# Patient Record
Sex: FEMALE
[De-identification: names, ages, dates, MRNs, and addresses within clinical notes are randomized per-mention and may not be internally consistent; named-entity substitution may affect disease eponyms.]

---

## 2020-09-14 ENCOUNTER — RESULT CHARGE (OUTPATIENT)
Age: 70
End: 2020-09-14

## 2020-09-14 ENCOUNTER — APPOINTMENT (OUTPATIENT)
Dept: UROLOGY | Facility: HOSPITAL | Age: 70
End: 2020-09-14
Payer: MEDICARE

## 2020-09-14 VITALS
HEIGHT: 63 IN | BODY MASS INDEX: 44.3 KG/M2 | HEART RATE: 88 BPM | TEMPERATURE: 97.3 F | DIASTOLIC BLOOD PRESSURE: 85 MMHG | SYSTOLIC BLOOD PRESSURE: 160 MMHG | WEIGHT: 250 LBS

## 2020-09-14 DIAGNOSIS — Z00.00 ENCOUNTER FOR GENERAL ADULT MEDICAL EXAMINATION W/OUT ABNORMAL FINDINGS: ICD-10-CM

## 2020-09-14 LAB
BILIRUB UR QL STRIP: NORMAL
CLARITY UR: CLEAR
COLLECTION METHOD: NORMAL
GLUCOSE UR-MCNC: NORMAL
HCG UR QL: 0.2 EU/DL
HGB UR QL STRIP.AUTO: NORMAL
KETONES UR-MCNC: NORMAL
LEUKOCYTE ESTERASE UR QL STRIP: NORMAL
NITRITE UR QL STRIP: NORMAL
PH UR STRIP: 6.5
PROT UR STRIP-MCNC: NORMAL
SP GR UR STRIP: 1.01

## 2020-09-14 PROCEDURE — 99214 OFFICE O/P EST MOD 30 MIN: CPT

## 2020-09-14 RX ORDER — PENTOSAN POLYSULFATE SODIUM 100 MG/1
100 CAPSULE, GELATIN COATED ORAL 3 TIMES DAILY
Qty: 270 | Refills: 3 | Status: ACTIVE | COMMUNITY
Start: 2020-09-14 | End: 1900-01-01

## 2020-09-14 RX ORDER — PHENAZOPYRIDINE HYDROCHLORIDE 200 MG/1
200 TABLET ORAL 3 TIMES DAILY
Qty: 9 | Refills: 3 | Status: ACTIVE | COMMUNITY
Start: 2020-09-14 | End: 1900-01-01

## 2020-09-14 NOTE — PHYSICAL EXAM
[General Appearance - Well Nourished] : well nourished [General Appearance - Well Developed] : well developed [Well Groomed] : well groomed [Normal Appearance] : normal appearance [Abdomen Soft] : soft [General Appearance - In No Acute Distress] : no acute distress [Abdomen Tenderness] : non-tender [Urethral Meatus] : meatus normal [Costovertebral Angle Tenderness] : no ~M costovertebral angle tenderness [Scrotum] : the scrotum was normal [Testes Mass (___cm)] : there were no testicular masses [Urinary Bladder Findings] : the bladder was normal on palpation [No Prostate Nodules] : no prostate nodules [Edema] : no peripheral edema [] : no rash [Exaggerated Use Of Accessory Muscles For Inspiration] : no accessory muscle use [Oriented To Time, Place, And Person] : oriented to person, place, and time [Respiration, Rhythm And Depth] : normal respiratory rhythm and effort [Mood] : the mood was normal [Affect] : the affect was normal [Not Anxious] : not anxious [Normal Station and Gait] : the gait and station were normal for the patient's age [No Focal Deficits] : no focal deficits [No Palpable Adenopathy] : no palpable adenopathy

## 2020-09-14 NOTE — ASSESSMENT
[FreeTextEntry1] : 70 year old female with Interstitial Cystitis.  Elmiron renewed and urine sent for culture.  Pt to see Dr. Willingham in coming weeks.  Possible will need tighter control over glucose as recent weight increase.  Urine sent for culture.

## 2020-09-14 NOTE — HISTORY OF PRESENT ILLNESS
[FreeTextEntry1] : Pt is a 70 year old female with long standing hx of intermittent bladder symptoms.  She was diagnosed with interstitial cystitis years ago and takes Elmiron and sertraline.  She ran out of Elmiron in the setting of the global pandemic and presents complaining of intermittent and worsening bladder discomfort.  She also admits to recent caffeine intake, which is new for her. \par \par cystoscopy and CT scan 8/2019\par \par PMH: HTN, GERD, asthma, spinal stenosis, DM\par PSH: cervical polyp\par \par

## 2020-09-16 LAB — BACTERIA UR CULT: NORMAL

## 2021-03-15 ENCOUNTER — APPOINTMENT (OUTPATIENT)
Dept: UROLOGY | Facility: CLINIC | Age: 71
End: 2021-03-15

## 2021-11-08 ENCOUNTER — APPOINTMENT (OUTPATIENT)
Dept: UROLOGY | Facility: CLINIC | Age: 71
End: 2021-11-08
Payer: MEDICARE

## 2021-11-08 VITALS
OXYGEN SATURATION: 97 % | BODY MASS INDEX: 46.06 KG/M2 | HEART RATE: 94 BPM | WEIGHT: 260 LBS | SYSTOLIC BLOOD PRESSURE: 146 MMHG | DIASTOLIC BLOOD PRESSURE: 90 MMHG

## 2021-11-08 DIAGNOSIS — N30.10 INTERSTITIAL CYSTITIS (CHRONIC) W/OUT HEMATURIA: ICD-10-CM

## 2021-11-08 PROCEDURE — 99214 OFFICE O/P EST MOD 30 MIN: CPT

## 2021-11-08 PROCEDURE — 81003 URINALYSIS AUTO W/O SCOPE: CPT | Mod: QW

## 2021-11-08 PROCEDURE — 51798 US URINE CAPACITY MEASURE: CPT

## 2021-11-08 RX ORDER — PENTOSAN POLYSULFATE SODIUM 100 MG/1
100 CAPSULE, GELATIN COATED ORAL 3 TIMES DAILY
Qty: 270 | Refills: 3 | Status: ACTIVE | COMMUNITY
Start: 2021-11-08 | End: 1900-01-01

## 2021-11-09 LAB
BILIRUB UR QL STRIP: NORMAL
CLARITY UR: CLEAR
COLLECTION METHOD: NORMAL
GLUCOSE UR-MCNC: NORMAL
HCG UR QL: 0.2 EU/DL
HGB UR QL STRIP.AUTO: NORMAL
KETONES UR-MCNC: NORMAL
LEUKOCYTE ESTERASE UR QL STRIP: NORMAL
NITRITE UR QL STRIP: NORMAL
PH UR STRIP: 6
PROT UR STRIP-MCNC: NORMAL
SP GR UR STRIP: 1.01

## 2021-11-09 NOTE — ADDENDUM
[FreeTextEntry1] : A portion of this note was written by [Gabriela Torres] on 11/08/2021 acting as a scribe for Dr. Zelaya. \par \par I have personally reviewed the chart and agree that the record accurately reflects my personal performance of the history, physical exam, assessment, and plan.

## 2021-11-09 NOTE — ASSESSMENT
[FreeTextEntry1] : 71 year old female with Interstitial Cystitis, stable on Elmiron. She plans to review her Elmiron use with her opthalmologist and will see a retinal specialist if they suggest she do so.  In the meantime I renewed her Rx and we will complete the prior authorization process as we have done in the past. \par \par Today's urine was sent for culture.   \par

## 2021-11-09 NOTE — HISTORY OF PRESENT ILLNESS
[FreeTextEntry1] : Pt is a 71 year old female with long standing hx of intermittent bladder symptoms.  She was diagnosed with interstitial cystitis years ago and takes Elmiron and sertraline. Today, she states she is still doing well on Elmiron. In August, she fell forward broke her toe.  Shortly after her fall, she had increased urinary frequency for 1 week which has since resolved. At the time she denies hematuria.  She has not seen a retinal specialist as I have previously suggested given the prolonged Elmiron use.  \par \par Two months ago she was started on metformin by her PCP for new onset diabetes.  She is scheduled for cataract surgery in 2/2022. \par \par Pt is fully vaccinated. \par \par cystoscopy and CT scan 8/2019\par \par Udip: (+) small garrett.\par \par PMH: HTN, GERD, asthma, spinal stenosis, DM\par PSH: cervical polyp\par \par Full Hx:  Pt is a 70 year old female with long standing hx of intermittent bladder symptoms.  She was diagnosed with interstitial cystitis years ago and takes Elmiron and sertraline.  She ran out of Elmiron in the setting of the global pandemic and presents complaining of intermittent and worsening bladder discomfort.  She also admits to recent caffeine intake, which is new for her. \par

## 2021-11-22 DIAGNOSIS — N39.0 URINARY TRACT INFECTION, SITE NOT SPECIFIED: ICD-10-CM

## 2021-11-22 LAB — BACTERIA UR CULT: ABNORMAL

## 2021-11-22 RX ORDER — CEFPODOXIME PROXETIL 200 MG/1
200 TABLET, FILM COATED ORAL
Qty: 10 | Refills: 0 | Status: ACTIVE | COMMUNITY
Start: 2021-11-22 | End: 1900-01-01

## 2022-12-05 ENCOUNTER — RX RENEWAL (OUTPATIENT)
Age: 72
End: 2022-12-05

## 2023-04-05 DIAGNOSIS — R30.0 DYSURIA: ICD-10-CM

## 2023-07-11 ENCOUNTER — APPOINTMENT (OUTPATIENT)
Dept: UROLOGY | Facility: CLINIC | Age: 73
End: 2023-07-11
Payer: MEDICARE

## 2023-07-11 VITALS
WEIGHT: 265 LBS | BODY MASS INDEX: 46.95 KG/M2 | TEMPERATURE: 97.3 F | SYSTOLIC BLOOD PRESSURE: 146 MMHG | OXYGEN SATURATION: 98 % | HEART RATE: 98 BPM | HEIGHT: 63 IN | DIASTOLIC BLOOD PRESSURE: 84 MMHG

## 2023-07-11 LAB
BILIRUB UR QL STRIP: NORMAL
CLARITY UR: CLEAR
COLLECTION METHOD: NORMAL
GLUCOSE UR-MCNC: NORMAL
HCG UR QL: 0.2 EU/DL
HGB UR QL STRIP.AUTO: NORMAL
KETONES UR-MCNC: NORMAL
LEUKOCYTE ESTERASE UR QL STRIP: NORMAL
NITRITE UR QL STRIP: NORMAL
PH UR STRIP: 6
PROT UR STRIP-MCNC: NORMAL
SP GR UR STRIP: 1

## 2023-07-11 PROCEDURE — 99215 OFFICE O/P EST HI 40 MIN: CPT

## 2023-07-11 RX ORDER — PENTOSAN POLYSULFATE SODIUM 100 MG/1
100 CAPSULE, GELATIN COATED ORAL 3 TIMES DAILY
Qty: 270 | Refills: 3 | Status: ACTIVE | COMMUNITY
Start: 2023-07-11 | End: 1900-01-01

## 2023-07-11 NOTE — ADDENDUM
[FreeTextEntry1] : A portion of this note was written by [Davion Wolf] on 07/11/2023 acting as a scribe for Dr. Zelaya. \par \par I have personally reviewed the chart and agree that the record accurately reflects my personal performance of the history, physical exam, assessment, and plan.\par

## 2023-07-11 NOTE — PHYSICAL EXAM

## 2023-07-11 NOTE — HISTORY OF PRESENT ILLNESS
[FreeTextEntry1] : Ms. ANTONY HOLLINGSWORTH comes in today for her Urologic follow up. Pt is a 72 yo F with hx of intermittent bladder symptoms and interstitial cystitis for which she takes Elmiron, (long-standing- started years ago by an outside provider).  She presents today for a f/u visit to assess her progress.   She ran out of meds 2 weeks ago and reports worsening pelvic pain, urinary frequency, urgency and nocturia. Pt reports that her symptoms worsened after she stopped Elmiron.  However, she recently was treated for positive urine culture with Cipro.  \par \par Of note, at the time of her last visit a year ago she was evaluated by optho- retinas fine.  She is due to follow up with them annually and will do so in the coming weeks. \par \par She also has vaginal symptoms so I sent a vaginal swab to r/o yeast today. \par \par Udip: 07/11/2023 ---moderate leuks \par PVR: 0cc (done to rule out incomplete bladder emptying) \par \par \par 11/8/21--- Pt is a 71 year old female with long standing hx of intermittent bladder symptoms.  She was diagnosed with interstitial cystitis years ago and takes Elmiron and sertraline. Today, she states she is still doing well on Elmiron. In August, she fell forward broke her toe.  Shortly after her fall, she had increased urinary frequency for 1 week which has since resolved. At the time she denies hematuria.  She has not seen a retinal specialist as I have previously suggested given the prolonged Elmiron use.  \par \par Two months ago she was started on metformin by her PCP for new onset diabetes.  She is scheduled for cataract surgery in 2/2022. \par \par Pt is fully vaccinated. \par \par cystoscopy and CT scan 8/2019\par \par Udip: (+) small garrett.\par \par PMH: HTN, GERD, asthma, spinal stenosis, DM\par PSH: cervical polyp\par \par Full Hx:  Pt is a 70 year old female with long standing hx of intermittent bladder symptoms.  She was diagnosed with interstitial cystitis years ago and takes Elmiron and sertraline.  She ran out of Elmiron in the setting of the global pandemic and presents complaining of intermittent and worsening bladder discomfort.  She also admits to recent caffeine intake, which is new for her. \par

## 2023-07-11 NOTE — ASSESSMENT
[FreeTextEntry1] : I discussed the findings and options with Ms. ANTONY HOLLINGSWORTH in detail.\par \par I reviewed the generic recommendations for UTI prevention with Ms. HOLLINGSWORTH in detail, including: wiping front to back, increasing fluid intake. She will incorporate these and understands that she should have a urine culture prior to being treated with any courses of antibiotics.\par \par For now, we agreed that she will continue taking Elmiron and follow up with Optho for a retina screening.  \par \par A vaginal swab was done today to r/o yeast infection. Urine was sent for culture.\par \par I have referred her to see a cardiologist as she has been told she had a questionable cardiac stress test but did not follow up as she was looking for a different provider. \par \par All of her questions were answered. Patient expressed understanding.\par

## 2023-07-12 ENCOUNTER — NON-APPOINTMENT (OUTPATIENT)
Age: 73
End: 2023-07-12

## 2023-07-14 LAB — BACTERIA GENITAL AEROBE CULT: NORMAL

## 2023-07-14 RX ORDER — AMOXICILLIN AND CLAVULANATE POTASSIUM 500; 125 MG/1; MG/1
500-125 TABLET, FILM COATED ORAL
Qty: 28 | Refills: 0 | Status: ACTIVE | COMMUNITY
Start: 2023-07-14 | End: 1900-01-01

## 2023-07-17 LAB — BACTERIA UR CULT: ABNORMAL

## 2023-07-17 RX ORDER — NITROFURANTOIN (MONOHYDRATE/MACROCRYSTALS) 25; 75 MG/1; MG/1
100 CAPSULE ORAL
Qty: 10 | Refills: 0 | Status: ACTIVE | COMMUNITY
Start: 2023-07-17 | End: 1900-01-01

## 2024-03-10 PROBLEM — N30.10 INTERSTITIAL CYSTITIS: Status: ACTIVE | Noted: 2021-11-08

## 2024-08-20 ENCOUNTER — APPOINTMENT (OUTPATIENT)
Dept: UROLOGY | Facility: CLINIC | Age: 74
End: 2024-08-20